# Patient Record
Sex: FEMALE | Race: WHITE | Employment: FULL TIME | URBAN - METROPOLITAN AREA
[De-identification: names, ages, dates, MRNs, and addresses within clinical notes are randomized per-mention and may not be internally consistent; named-entity substitution may affect disease eponyms.]

---

## 2023-07-10 ENCOUNTER — OFFICE VISIT (OUTPATIENT)
Dept: ORTHOPEDIC SURGERY | Age: 63
End: 2023-07-10
Payer: COMMERCIAL

## 2023-07-10 DIAGNOSIS — M20.11 VALGUS DEFORMITY OF BOTH GREAT TOES: Primary | ICD-10-CM

## 2023-07-10 DIAGNOSIS — M20.12 VALGUS DEFORMITY OF BOTH GREAT TOES: Primary | ICD-10-CM

## 2023-07-10 PROCEDURE — 99204 OFFICE O/P NEW MOD 45 MIN: CPT | Performed by: ORTHOPAEDIC SURGERY

## 2023-07-10 RX ORDER — LANOLIN ALCOHOL/MO/W.PET/CERES
100 CREAM (GRAM) TOPICAL DAILY
COMMUNITY
Start: 2023-06-07

## 2023-07-10 NOTE — PROGRESS NOTES
Name: Lito Cox  YOB: 1960  Gender: female  MRN: [id number]    Summary:   Bilateral severe hallux valgus     CC: Foot Pain (Bilateral foot pain-did bring xray disc to appt)       HPI: Lito Cox is a 58 y.o. female who presents with Foot Pain (Bilateral foot pain-did bring xray disc to appt)  . This patient presents the office today as a second opinion from Dr. Lorraine Charlton. She has a history of bilateral severe hallux valgus deformities. They did discuss potential Lapidus procedure but she was interested in potential minimal invasive bunionectomy. History was obtained by Patient     ROS/Meds/PSH/PMH/FH/SH: I personally reviewed the patients standard intake form. Below are the pertinents    Tobacco:  has no history on file for tobacco use. Diabetes: None      Physical Examination:    Exam of the bilateral feet shows severe hallux valgus deformities with pronation of the great toe. She has some significant TMT instability bilaterally. There are no lesser toe deformities. She has palpable pulses and intact sensation. Imaging:   I independently interpreted XR ordered by a different physician, taken from an outside facility bilateral feet shows severe hallux valgus deformities          Assessment:   Bilateral hallux valgus    Treatment Plan:   4 This is a chronic illness/condition with exacerbation and progression  Treatment at this time: Elective major surgery with procedural risk factors  Studies ordered: NO XR needed @ Next Visit    Weight-bearing status: WBAT        Return to work/work restrictions: none  No medications given    I told her given her age I would worry about osteoporosis with minimal base of bunionectomies and I think she be best served with a Lapidus procedure. We have given information information about this today as well as the recovery process. She will call us back if she like to proceed.         Right or left Lapidus bunionectomy with

## 2024-02-13 RX ORDER — ANTACID TABLETS 500 MG/1
1 TABLET, CHEWABLE ORAL DAILY
COMMUNITY

## 2024-02-13 RX ORDER — LANOLIN ALCOHOL/MO/W.PET/CERES
100 CREAM (GRAM) TOPICAL DAILY
COMMUNITY
Start: 2023-06-02

## 2024-02-14 ENCOUNTER — OFFICE VISIT (OUTPATIENT)
Dept: ORTHOPEDIC SURGERY | Age: 64
End: 2024-02-14

## 2024-02-14 DIAGNOSIS — M20.12 VALGUS DEFORMITY OF BOTH GREAT TOES: Primary | ICD-10-CM

## 2024-02-14 DIAGNOSIS — M20.11 VALGUS DEFORMITY OF BOTH GREAT TOES: Primary | ICD-10-CM

## 2024-02-14 PROCEDURE — 99214 OFFICE O/P EST MOD 30 MIN: CPT | Performed by: ORTHOPAEDIC SURGERY

## 2024-02-14 NOTE — PROGRESS NOTES
Name: Surekha Boston  YOB: 1960  Gender: female  MRN: [id number]    Summary:     Bilateral hallux valgus     CC: Follow-up (Bilateral severe hallux valgus)       HPI: Surekha Boston is a 63 y.o. female who presents with Follow-up (Bilateral severe hallux valgus)  .  This patient presents back to the office today.  Complaints of bilateral forefoot pain and hallux valgus pain.  She has tried pads and spacers as well as shoe modifications for multiple years.  The pain is affecting activity living.    History was obtained by Patient     ROS/Meds/PSH/PMH/FH/SH: I personally reviewed the patients standard intake form.  Below are the pertinents    Tobacco:  has no history on file for tobacco use.  Diabetes: None      Physical Examination:  Exam of the bilateral feet shows severe hallux valgus deformities bilateral with obvious TMT instability.  There is no lesser toe pain or deformities.  She has palpable pulses and intact sensation.      Imaging:   Interpretation of imaging  =         DARCIE BULL III, MD           Assessment:   Bilateral hallux valgus with TMT instability    Treatment Plan:   4 This is a chronic illness/condition with exacerbation and progression  Treatment at this time: Elective major surgery with procedural risk factors  Studies ordered: NO XR needed @ Next Visit    Weight-bearing status: WBAT        Return to work/work restrictions: none  No medications given        Right Lapidus bunionectomy with Asif osteotomy and calcaneal autograft harvest    Outpatient - 1-1/4 hours, c-arm, sagittal saw, K-wires , Sanchez plates & screws     Anesthesia -  Choice       The patient understands the diagnosis of bunions and claw toes, conservative and surgical treatment.  R/C outlined including the risk of recurrence, risk of non-healing of skin and bone with/without infection,  potential loss/amputation of a toe(s) secondary to circulation loss, the risk of stiffness in the toes,

## 2024-02-15 DIAGNOSIS — M20.11 VALGUS DEFORMITY OF BOTH GREAT TOES: Primary | ICD-10-CM

## 2024-02-15 DIAGNOSIS — M20.12 VALGUS DEFORMITY OF BOTH GREAT TOES: Primary | ICD-10-CM
